# Patient Record
Sex: FEMALE | Race: WHITE
[De-identification: names, ages, dates, MRNs, and addresses within clinical notes are randomized per-mention and may not be internally consistent; named-entity substitution may affect disease eponyms.]

---

## 2022-05-09 ENCOUNTER — HOSPITAL ENCOUNTER (INPATIENT)
Dept: HOSPITAL 75 - WSO | Age: 27
LOS: 2 days | Discharge: HOME | End: 2022-05-11
Attending: FAMILY MEDICINE | Admitting: FAMILY MEDICINE
Payer: COMMERCIAL

## 2022-05-09 VITALS — SYSTOLIC BLOOD PRESSURE: 82 MMHG | DIASTOLIC BLOOD PRESSURE: 47 MMHG

## 2022-05-09 VITALS — SYSTOLIC BLOOD PRESSURE: 114 MMHG | DIASTOLIC BLOOD PRESSURE: 59 MMHG

## 2022-05-09 VITALS — DIASTOLIC BLOOD PRESSURE: 60 MMHG | SYSTOLIC BLOOD PRESSURE: 112 MMHG

## 2022-05-09 VITALS — DIASTOLIC BLOOD PRESSURE: 71 MMHG | SYSTOLIC BLOOD PRESSURE: 121 MMHG

## 2022-05-09 VITALS — DIASTOLIC BLOOD PRESSURE: 68 MMHG | SYSTOLIC BLOOD PRESSURE: 112 MMHG

## 2022-05-09 VITALS — DIASTOLIC BLOOD PRESSURE: 57 MMHG | SYSTOLIC BLOOD PRESSURE: 92 MMHG

## 2022-05-09 VITALS — SYSTOLIC BLOOD PRESSURE: 101 MMHG | DIASTOLIC BLOOD PRESSURE: 55 MMHG

## 2022-05-09 VITALS — SYSTOLIC BLOOD PRESSURE: 112 MMHG | DIASTOLIC BLOOD PRESSURE: 65 MMHG

## 2022-05-09 VITALS — DIASTOLIC BLOOD PRESSURE: 56 MMHG | SYSTOLIC BLOOD PRESSURE: 117 MMHG

## 2022-05-09 VITALS — SYSTOLIC BLOOD PRESSURE: 132 MMHG | DIASTOLIC BLOOD PRESSURE: 79 MMHG

## 2022-05-09 VITALS — DIASTOLIC BLOOD PRESSURE: 54 MMHG | SYSTOLIC BLOOD PRESSURE: 90 MMHG

## 2022-05-09 VITALS — SYSTOLIC BLOOD PRESSURE: 94 MMHG | DIASTOLIC BLOOD PRESSURE: 48 MMHG

## 2022-05-09 VITALS — DIASTOLIC BLOOD PRESSURE: 60 MMHG | SYSTOLIC BLOOD PRESSURE: 93 MMHG

## 2022-05-09 VITALS — SYSTOLIC BLOOD PRESSURE: 89 MMHG | DIASTOLIC BLOOD PRESSURE: 54 MMHG

## 2022-05-09 VITALS — DIASTOLIC BLOOD PRESSURE: 57 MMHG | SYSTOLIC BLOOD PRESSURE: 98 MMHG

## 2022-05-09 VITALS — DIASTOLIC BLOOD PRESSURE: 50 MMHG | SYSTOLIC BLOOD PRESSURE: 91 MMHG

## 2022-05-09 VITALS — DIASTOLIC BLOOD PRESSURE: 57 MMHG | SYSTOLIC BLOOD PRESSURE: 110 MMHG

## 2022-05-09 VITALS — DIASTOLIC BLOOD PRESSURE: 56 MMHG | SYSTOLIC BLOOD PRESSURE: 103 MMHG

## 2022-05-09 VITALS — DIASTOLIC BLOOD PRESSURE: 62 MMHG | SYSTOLIC BLOOD PRESSURE: 113 MMHG

## 2022-05-09 VITALS — SYSTOLIC BLOOD PRESSURE: 110 MMHG | DIASTOLIC BLOOD PRESSURE: 59 MMHG

## 2022-05-09 VITALS — SYSTOLIC BLOOD PRESSURE: 108 MMHG | DIASTOLIC BLOOD PRESSURE: 57 MMHG

## 2022-05-09 VITALS — DIASTOLIC BLOOD PRESSURE: 60 MMHG | SYSTOLIC BLOOD PRESSURE: 114 MMHG

## 2022-05-09 VITALS — SYSTOLIC BLOOD PRESSURE: 109 MMHG | DIASTOLIC BLOOD PRESSURE: 62 MMHG

## 2022-05-09 VITALS — DIASTOLIC BLOOD PRESSURE: 64 MMHG | SYSTOLIC BLOOD PRESSURE: 119 MMHG

## 2022-05-09 VITALS — SYSTOLIC BLOOD PRESSURE: 92 MMHG | DIASTOLIC BLOOD PRESSURE: 55 MMHG

## 2022-05-09 VITALS — SYSTOLIC BLOOD PRESSURE: 112 MMHG | DIASTOLIC BLOOD PRESSURE: 59 MMHG

## 2022-05-09 VITALS — SYSTOLIC BLOOD PRESSURE: 141 MMHG | DIASTOLIC BLOOD PRESSURE: 61 MMHG

## 2022-05-09 VITALS — DIASTOLIC BLOOD PRESSURE: 59 MMHG | SYSTOLIC BLOOD PRESSURE: 100 MMHG

## 2022-05-09 VITALS — DIASTOLIC BLOOD PRESSURE: 62 MMHG | SYSTOLIC BLOOD PRESSURE: 120 MMHG

## 2022-05-09 VITALS — DIASTOLIC BLOOD PRESSURE: 55 MMHG | SYSTOLIC BLOOD PRESSURE: 98 MMHG

## 2022-05-09 VITALS — DIASTOLIC BLOOD PRESSURE: 68 MMHG | SYSTOLIC BLOOD PRESSURE: 122 MMHG

## 2022-05-09 VITALS — DIASTOLIC BLOOD PRESSURE: 71 MMHG | SYSTOLIC BLOOD PRESSURE: 119 MMHG

## 2022-05-09 VITALS — DIASTOLIC BLOOD PRESSURE: 52 MMHG | SYSTOLIC BLOOD PRESSURE: 96 MMHG

## 2022-05-09 VITALS — SYSTOLIC BLOOD PRESSURE: 87 MMHG | DIASTOLIC BLOOD PRESSURE: 51 MMHG

## 2022-05-09 VITALS — SYSTOLIC BLOOD PRESSURE: 112 MMHG | DIASTOLIC BLOOD PRESSURE: 67 MMHG

## 2022-05-09 VITALS — DIASTOLIC BLOOD PRESSURE: 61 MMHG | SYSTOLIC BLOOD PRESSURE: 118 MMHG

## 2022-05-09 VITALS — SYSTOLIC BLOOD PRESSURE: 113 MMHG | DIASTOLIC BLOOD PRESSURE: 57 MMHG

## 2022-05-09 VITALS — DIASTOLIC BLOOD PRESSURE: 68 MMHG | SYSTOLIC BLOOD PRESSURE: 110 MMHG

## 2022-05-09 VITALS — WEIGHT: 293 LBS | HEIGHT: 67.99 IN | BODY MASS INDEX: 44.41 KG/M2

## 2022-05-09 VITALS — DIASTOLIC BLOOD PRESSURE: 55 MMHG | SYSTOLIC BLOOD PRESSURE: 97 MMHG

## 2022-05-09 VITALS — DIASTOLIC BLOOD PRESSURE: 43 MMHG | SYSTOLIC BLOOD PRESSURE: 82 MMHG

## 2022-05-09 VITALS — SYSTOLIC BLOOD PRESSURE: 84 MMHG | DIASTOLIC BLOOD PRESSURE: 50 MMHG

## 2022-05-09 VITALS — SYSTOLIC BLOOD PRESSURE: 87 MMHG | DIASTOLIC BLOOD PRESSURE: 54 MMHG

## 2022-05-09 VITALS — DIASTOLIC BLOOD PRESSURE: 52 MMHG | SYSTOLIC BLOOD PRESSURE: 94 MMHG

## 2022-05-09 VITALS — DIASTOLIC BLOOD PRESSURE: 61 MMHG | SYSTOLIC BLOOD PRESSURE: 120 MMHG

## 2022-05-09 VITALS — DIASTOLIC BLOOD PRESSURE: 51 MMHG | SYSTOLIC BLOOD PRESSURE: 93 MMHG

## 2022-05-09 VITALS — DIASTOLIC BLOOD PRESSURE: 58 MMHG | SYSTOLIC BLOOD PRESSURE: 100 MMHG

## 2022-05-09 VITALS — DIASTOLIC BLOOD PRESSURE: 75 MMHG | SYSTOLIC BLOOD PRESSURE: 137 MMHG

## 2022-05-09 VITALS — SYSTOLIC BLOOD PRESSURE: 89 MMHG | DIASTOLIC BLOOD PRESSURE: 52 MMHG

## 2022-05-09 VITALS — SYSTOLIC BLOOD PRESSURE: 93 MMHG | DIASTOLIC BLOOD PRESSURE: 46 MMHG

## 2022-05-09 DIAGNOSIS — Z3A.38: ICD-10-CM

## 2022-05-09 DIAGNOSIS — Z88.2: ICD-10-CM

## 2022-05-09 DIAGNOSIS — Z88.0: ICD-10-CM

## 2022-05-09 DIAGNOSIS — E66.9: ICD-10-CM

## 2022-05-09 DIAGNOSIS — Z88.1: ICD-10-CM

## 2022-05-09 LAB
ALBUMIN SERPL-MCNC: 3.3 GM/DL (ref 3.2–4.5)
ALP SERPL-CCNC: 186 U/L (ref 40–136)
ALT SERPL-CCNC: 18 U/L (ref 0–55)
BASOPHILS # BLD AUTO: 0 10^3/UL (ref 0–0.1)
BASOPHILS NFR BLD AUTO: 0 % (ref 0–10)
BILIRUB SERPL-MCNC: 0.6 MG/DL (ref 0.1–1)
BUN/CREAT SERPL: 13
CALCIUM SERPL-MCNC: 8.9 MG/DL (ref 8.5–10.1)
CHLORIDE SERPL-SCNC: 107 MMOL/L (ref 98–107)
CO2 SERPL-SCNC: 20 MMOL/L (ref 21–32)
CREAT SERPL-MCNC: 0.68 MG/DL (ref 0.6–1.3)
CREAT UR-MCNC: 143 MG/DL (ref 30–125)
EOSINOPHIL # BLD AUTO: 0 10^3/UL (ref 0–0.3)
EOSINOPHIL NFR BLD AUTO: 0 % (ref 0–10)
GFR SERPLBLD BASED ON 1.73 SQ M-ARVRAT: 124 ML/MIN
GLUCOSE SERPL-MCNC: 78 MG/DL (ref 70–105)
HCT VFR BLD CALC: 35 % (ref 35–52)
HGB BLD-MCNC: 11.9 G/DL (ref 11.5–16)
LYMPHOCYTES # BLD AUTO: 2.3 10^3/UL (ref 1–4)
LYMPHOCYTES NFR BLD AUTO: 22 % (ref 12–44)
MANUAL DIFFERENTIAL PERFORMED BLD QL: NO
MCH RBC QN AUTO: 30 PG (ref 25–34)
MCHC RBC AUTO-ENTMCNC: 34 G/DL (ref 32–36)
MCV RBC AUTO: 87 FL (ref 80–99)
MONOCYTES # BLD AUTO: 0.7 10^3/UL (ref 0–1)
MONOCYTES NFR BLD AUTO: 7 % (ref 0–12)
NEUTROPHILS # BLD AUTO: 7.2 10^3/UL (ref 1.8–7.8)
NEUTROPHILS NFR BLD AUTO: 70 % (ref 42–75)
PLATELET # BLD: 190 10^3/UL (ref 130–400)
PMV BLD AUTO: 12.3 FL (ref 9–12.2)
POTASSIUM SERPL-SCNC: 3.9 MMOL/L (ref 3.6–5)
PROT SERPL-MCNC: 6.6 GM/DL (ref 6.4–8.2)
PROT UR-MCNC: 11 MG/DL (ref 6–12)
SODIUM SERPL-SCNC: 136 MMOL/L (ref 135–145)
URATE SERPL-MCNC: 3.6 MG/DL (ref 2.6–7.2)
WBC # BLD AUTO: 10.2 10^3/UL (ref 4.3–11)

## 2022-05-09 PROCEDURE — 80053 COMPREHEN METABOLIC PANEL: CPT

## 2022-05-09 PROCEDURE — 84550 ASSAY OF BLOOD/URIC ACID: CPT

## 2022-05-09 PROCEDURE — 86901 BLOOD TYPING SEROLOGIC RH(D): CPT

## 2022-05-09 PROCEDURE — 82570 ASSAY OF URINE CREATININE: CPT

## 2022-05-09 PROCEDURE — 86850 RBC ANTIBODY SCREEN: CPT

## 2022-05-09 PROCEDURE — 86900 BLOOD TYPING SEROLOGIC ABO: CPT

## 2022-05-09 PROCEDURE — 85025 COMPLETE CBC W/AUTO DIFF WBC: CPT

## 2022-05-09 PROCEDURE — 84156 ASSAY OF PROTEIN URINE: CPT

## 2022-05-09 PROCEDURE — 36415 COLL VENOUS BLD VENIPUNCTURE: CPT

## 2022-05-09 PROCEDURE — 83615 LACTATE (LD) (LDH) ENZYME: CPT

## 2022-05-09 RX ADMIN — SODIUM CHLORIDE, SODIUM LACTATE, POTASSIUM CHLORIDE, CALCIUM CHLORIDE, AND DEXTROSE MONOHYDRATE SCH MLS/HR: 600; 310; 30; 20; 5 INJECTION, SOLUTION INTRAVENOUS at 20:23

## 2022-05-09 RX ADMIN — SODIUM CHLORIDE, SODIUM LACTATE, POTASSIUM CHLORIDE, CALCIUM CHLORIDE, AND DEXTROSE MONOHYDRATE SCH MLS/HR: 600; 310; 30; 20; 5 INJECTION, SOLUTION INTRAVENOUS at 13:15

## 2022-05-09 RX ADMIN — ONDANSETRON PRN MG: 2 INJECTION, SOLUTION INTRAMUSCULAR; INTRAVENOUS at 17:47

## 2022-05-09 RX ADMIN — ONDANSETRON PRN MG: 2 INJECTION, SOLUTION INTRAMUSCULAR; INTRAVENOUS at 22:06

## 2022-05-09 NOTE — LABOR PROGRESS NOTE
Labor Progress Note


Labor Progress Note


Date Seen by Provider:  May 9, 2022


Time Seen by Provider:  17:39


Subjective:


Pt denies complaints. Feeling much better since getting the epidural.





Objective:


/-1


Reactive FHT





Assessment/Plan:


Blanca Solis  is a (27  /Para  2 / 1,Gestational Age (wks)38.1 here for

pre eclampsia and contractions


CEFM/TOCO


AROM 1730 Clear


Epidural for pain control


Expectant management, consider augmentation with pitocin if not changing after 

AROM


GBS +, PCN allergy, on Vancomycin





Vitals - Labs


Vital Signs - I&O





Vital Signs








  Date Time  Temp Pulse Resp B/P (MAP) Pulse Ox O2 Delivery O2 Flow Rate FiO2


 


22 16:40  64  82/43 (56) 100   


 


22 16:38  76 16 103/56 (72) 99   


 


22 16:34  97 18 113/62 (79) 99 Room Air  


 


22 16:31  84 18 109/62 (78) 99 Room Air  


 


22 16:28  72 18 112/67 (82) 100 Room Air  


 


22 16:25  80 16 112/68 (83) 100 Room Air  


 


22 16:21  80 18 121/71 (88) 100 Room Air  


 


22 12:14  87 18 122/68 (86)    


 


22 11:55  90 18 119/71 (87) 98 Room Air  


 


22 11:30 36.5 90 16 132/79 (96) 98 Room Air  


 


22 11:30 36.5 90 18  98 Room Air  











Labs


Laboratory Tests


22 12:42: 


White Blood Count 10.2, Red Blood Count 4.00, Hemoglobin 11.9, Hematocrit 35, Me

an Corpuscular Volume 87, Mean Corpuscular Hemoglobin 30, Mean Corpuscular 

Hemoglobin Concent 34, Red Cell Distribution Width 13.3, Platelet Count 190, 

Mean Platelet Volume 12.3H, Immature Granulocyte % (Auto) 0, Neutrophils (%) 

(Auto) 70, Lymphocytes (%) (Auto) 22, Monocytes (%) (Auto) 7, Eosinophils (%) 

(Auto) 0, Basophils (%) (Auto) 0, Neutrophils # (Auto) 7.2, Lymphocytes # (Auto)

2.3, Monocytes # (Auto) 0.7, Eosinophils # (Auto) 0.0, Basophils # (Auto) 0.0, 

Immature Granulocyte # (Auto) 0.0, Urine Protein 11, Urine Creatinine 143H, 

Urine Protein/Creatinine Ratio 0.08, Sodium Level 136, Potassium Level 3.9, 

Chloride Level 107, Carbon Dioxide Level 20L, Anion Gap 9, Blood Urea Nitrogen 

9, Creatinine 0.68, Estimat Glomerular Filtration Rate 124, BUN/Creatinine Ratio

13, Glucose Level 78, Uric Acid 3.6, Calcium Level 8.9, Corrected Calcium 9.5, 

Total Bilirubin 0.6, Aspartate Amino Transf (AST/SGOT) 11, Alanine 

Aminotransferase (ALT/SGPT) 18, Alkaline Phosphatase 186H, Lactate Dehydrogenase

213, Total Protein 6.6, Albumin 3.3











PRINCE THAKUR MD                May 9, 2022 17:41

## 2022-05-09 NOTE — HISTORY & PHYSICAL-OB
OB - Chief Complaint & HPI


Date/Time


Date of Admission:


Date of Admission:  May 9, 2022 at 11:06


Date seen by a Provider:  May 9, 2022


Time Seen by a Provider:  11:30





Chief Complaint/History


OB-Reason for Admission/Chief:  Obstetrical Complication (Pre eclampsia)


Hx :  2


Hx Para:  1


Expected Date of Delivery:  May 22, 2022


Gestational Age in Weeks:  38


Gestational Age in Days:  1


Indication for induction:  medical complication (Pre eclampsia, advanced 

dilation, h/o  delivery)


History of Labs


A neg, Ab neg, Rub Imm


HIV/RPR/HepB/C NR


Abnormal 1 hr GTT, Normal 3 hr GTT


GBS +





Allergies and Home Medications


Allergies


Coded Allergies:  


     No Known Drug Allergies (Unverified , 21)





Patient Home Medication List


Home Medication List Reviewed:  Yes


Cefdinir (Cefdinir) 300 Mg Capsule, 300 MG PO BID


   Prescribed by: MOOKIE RAPP on 21 1148


Ondansetron (Ondansetron Odt) 4 Mg Tab.rapdis, 4 MG PO Q6H PRN for 

NAUSEA/VOMITING-1ST LINE


   Prescribed by: MOOKIE RAPP on 21 1148





OB - History


Hx of Present Pregnancy


Ultrasounds:  Normal mid trimester US


Obstetrical Complications:  Pre-eclampsia


Medical Complications:  None





Obstetrical History


Hx :  2


Hx Para:  1


Hx #  Pregnancies:  1


Number of Living Children:  1





Patient Past Medical History





Obesity





Social History/Family History


Alcohol Use:  Denies Use





Immunizations


Influenza Vaccine Up-to-Date:  Yes; Up-to-Date


First/Initial COVID19 Vaccine:  2021


Second COVID19 Vaccination:  2021


Hepatitis A:  No


Hepatitis B:  No


Tetanus Booster (TDap):  Less than 5yrs (3/21/22)


Rubella:  immune


RPR/VDRL:  Negative


GBS Status:  Positive


HBsAG:  Negative





OB - Admission Exam


Physical Exam


Vitals:





Vital Signs








 22





 11:30


 


Temp 36.5


 


Pulse 90


 


Resp 18


 


Pulse Ox 98


 


O2 Delivery Room Air








HEENT:  NCAT


Heart:  Rhythm Normal


Lungs:  Clear


Abdomen:  Gravid


Cervical Dilatation:  4cm


Effacement:  75%


Station:  0


Membranes:  Intact


Fetal Heart Rate:  140's


Accelerations:  Accelerations Present





Asles Scoring Tool (Modified)


Dilation (cm):  3-4cm (2)


Effacement (%):  51-79%  (2)


Fetal Descent/Station:  -1,0     (2)


Cervix Consistency:  Medium(1)


Cervix Position:  Middle/Mid-Position  (1)


Add 1 point for:  Each previous vaginal delivery (1)


Labs





Laboratory Tests








Test


 22


12:42 Range/Units


 


 


White Blood Count


 10.2 


 4.3-11.0


10^3/uL


 


Red Blood Count


 4.00 


 3.80-5.11


10^6/uL


 


Hemoglobin 11.9  11.5-16.0  g/dL


 


Hematocrit 35  35-52  %


 


Mean Corpuscular Volume 87  80-99  fL


 


Mean Corpuscular Hemoglobin 30  25-34  pg


 


Mean Corpuscular Hemoglobin


Concent 34 


 32-36  g/dL





 


Red Cell Distribution Width 13.3  10.0-14.5  %


 


Platelet Count


 190 


 130-400


10^3/uL


 


Mean Platelet Volume 12.3 H 9.0-12.2  fL


 


Immature Granulocyte % (Auto) 0   %


 


Neutrophils (%) (Auto) 70  42-75  %


 


Lymphocytes (%) (Auto) 22  12-44  %


 


Monocytes (%) (Auto) 7  0-12  %


 


Eosinophils (%) (Auto) 0  0-10  %


 


Basophils (%) (Auto) 0  0-10  %


 


Neutrophils # (Auto)


 7.2 


 1.8-7.8


10^3/uL


 


Lymphocytes # (Auto)


 2.3 


 1.0-4.0


10^3/uL


 


Monocytes # (Auto)


 0.7 


 0.0-1.0


10^3/uL


 


Eosinophils # (Auto)


 0.0 


 0.0-0.3


10^3/uL


 


Basophils # (Auto)


 0.0 


 0.0-0.1


10^3/uL


 


Immature Granulocyte # (Auto)


 0.0 


 0.0-0.1


10^3/uL


 


Sodium Level 136  135-145  MMOL/L


 


Potassium Level 3.9  3.6-5.0  MMOL/L


 


Chloride Level 107    MMOL/L


 


Albumin 3.3  3.2-4.5  GM/DL











OB - Assessment/Plan/Diagnosis


Assessment


Assessment:  active labor, group B positive strep


Admission Dx


Third Trimester pregnancy


38 week gestation


Pre eclampsia


Previous h/o  delivery


GBS +


Admission Status:  Inpatient Order (span 2 midnights)


Reason for Inpatient Admission:  


Pre eclampsia


Labor





Plan


Other Plan


28 yo  @ 38.1 wga here for Pre eclampsia





Plan


- GBS +


- Expectant management


- AROM after antibiotics given











PRINCE THAKUR MD                May 9, 2022 13:34

## 2022-05-10 VITALS — DIASTOLIC BLOOD PRESSURE: 59 MMHG | SYSTOLIC BLOOD PRESSURE: 102 MMHG

## 2022-05-10 VITALS — SYSTOLIC BLOOD PRESSURE: 127 MMHG | DIASTOLIC BLOOD PRESSURE: 60 MMHG

## 2022-05-10 VITALS — DIASTOLIC BLOOD PRESSURE: 59 MMHG | SYSTOLIC BLOOD PRESSURE: 126 MMHG

## 2022-05-10 VITALS — SYSTOLIC BLOOD PRESSURE: 112 MMHG | DIASTOLIC BLOOD PRESSURE: 55 MMHG

## 2022-05-10 VITALS — DIASTOLIC BLOOD PRESSURE: 56 MMHG | SYSTOLIC BLOOD PRESSURE: 102 MMHG

## 2022-05-10 VITALS — SYSTOLIC BLOOD PRESSURE: 107 MMHG | DIASTOLIC BLOOD PRESSURE: 58 MMHG

## 2022-05-10 VITALS — SYSTOLIC BLOOD PRESSURE: 120 MMHG | DIASTOLIC BLOOD PRESSURE: 62 MMHG

## 2022-05-10 VITALS — DIASTOLIC BLOOD PRESSURE: 72 MMHG | SYSTOLIC BLOOD PRESSURE: 116 MMHG

## 2022-05-10 VITALS — SYSTOLIC BLOOD PRESSURE: 124 MMHG | DIASTOLIC BLOOD PRESSURE: 63 MMHG

## 2022-05-10 VITALS — SYSTOLIC BLOOD PRESSURE: 108 MMHG | DIASTOLIC BLOOD PRESSURE: 57 MMHG

## 2022-05-10 VITALS — DIASTOLIC BLOOD PRESSURE: 64 MMHG | SYSTOLIC BLOOD PRESSURE: 115 MMHG

## 2022-05-10 VITALS — DIASTOLIC BLOOD PRESSURE: 65 MMHG | SYSTOLIC BLOOD PRESSURE: 117 MMHG

## 2022-05-10 VITALS — DIASTOLIC BLOOD PRESSURE: 66 MMHG | SYSTOLIC BLOOD PRESSURE: 106 MMHG

## 2022-05-10 VITALS — SYSTOLIC BLOOD PRESSURE: 113 MMHG | DIASTOLIC BLOOD PRESSURE: 60 MMHG

## 2022-05-10 PROCEDURE — 0HQ9XZZ REPAIR PERINEUM SKIN, EXTERNAL APPROACH: ICD-10-PCS | Performed by: FAMILY MEDICINE

## 2022-05-10 RX ADMIN — ACETAMINOPHEN SCH MG: 500 TABLET ORAL at 08:30

## 2022-05-10 RX ADMIN — IBUPROFEN SCH MG: 600 TABLET ORAL at 08:30

## 2022-05-10 RX ADMIN — ACETAMINOPHEN SCH MG: 500 TABLET ORAL at 21:31

## 2022-05-10 RX ADMIN — ACETAMINOPHEN SCH MG: 500 TABLET ORAL at 15:12

## 2022-05-10 RX ADMIN — DOCUSATE SODIUM SCH MG: 100 CAPSULE ORAL at 08:30

## 2022-05-10 RX ADMIN — ACETAMINOPHEN SCH MG: 500 TABLET ORAL at 06:46

## 2022-05-10 RX ADMIN — IBUPROFEN SCH MG: 600 TABLET ORAL at 21:30

## 2022-05-10 RX ADMIN — IBUPROFEN SCH MG: 600 TABLET ORAL at 02:07

## 2022-05-10 RX ADMIN — DOCUSATE SODIUM SCH MG: 100 CAPSULE ORAL at 21:30

## 2022-05-10 RX ADMIN — IBUPROFEN SCH MG: 600 TABLET ORAL at 15:13

## 2022-05-10 NOTE — OB LABOR & DELIVERY RECORD
Vag Delivery Note


Vag Delivery Note


Date of Delivery: 5/10/22 





Preoperative Diagnosis: Blanca Solis  is a (27  /Para  2 / 1,

Gestational Age (wks)38.1 here with contractions and Pre eclampsia with mild 

features





Postoperative Diagnosis: Same


Surgeon: PRINCE THAKUR MD 





Assistant: Araceli Fraser MS3





Anesthesia: Epidural





Delivery Type:  @ 0010





Findings: 


Viable female infant, apgars 9/9, weight 7#8, 3405 grams


Lacerations: 1st degree perineal, right labial abrasion


Intact placenta with 3 vessel cord. No nuchal cord, body cord or shoulder 

dystocia





Estimated Blood Loss: 100 ml





Complications: None





Condition: Stable





Description of Procedure:





The patient is a 27 year old female who presented for IOL for pre eclampsia. She

was admitted and informed consent was obtained. Her labor course was 

unremarkable. She progressed to complete dilatation and began to push. 





She was then set up for delivery. The infant's head was delivered atraumatically

in the CHRISSY position. The shoulders and remainder of the infant's body were then 

delivered without difficulty. Upon delivery, the head was held below the level 

of the perineum and the mouth and nares were bulb suctioned. The cord was doubly

clamped and cut after 3 min delay and the infant was attended to by the 

pediatric staff on maternal abdomen. An intact placenta with 3-vessel cord 

delivered via [place] and there was found to be minimal bleeding.~ Vigorous 

fundal massage was performed and the fundus was found to be firm. IV oxytocin 

was given. Examination of the vagina and perineum revealed a 1st degree perineal

laceration and right labial abrasion that did not require repair. Following the 

repair, sponge, instrument and needle counts were correct. Mom and baby were 

both in stable condition in the labor suite.





Vitals - Labs


Vital Signs - I&O





Vital Signs








  Date Time  Temp Pulse Resp B/P (MAP) Pulse Ox O2 Delivery O2 Flow Rate FiO2


 


22 22:45  79 18 89/54 (66) 99 Room Air  


 


22 22:30  93 18 141/61 (87) 99 Room Air  


 


22 22:18  76 18 110/57 (74) 99 Room Air  


 


22 22:15  77 18 98/57 (71) 99 Room Air  


 


22 22:00  78 18 101/55 (70) 99 Room Air  


 


22 21:45  83 18  99 Room Air  


 


22 21:30  95 18 94/48 (63) 100 Room Air  


 


22 21:15  74 18 93/51 (65) 98 Room Air  


 


22 21:00  64 18 90/54 (66) 99 Room Air  


 


22 20:45 36.3 68 18 91/50 (64) 99 Room Air  


 


22 20:30  76 18 120/61 (80) 99 Room Air  


 


22 20:15  69 18 114/59 (77) 99 Room Air  


 


22 20:00  85 18 119/64 (82) 99 Room Air  


 


22 19:45  75 18 108/57 (74) 97 Room Air  


 


22 19:30  77 18 110/59 (76) 97 Room Air  


 


22 19:15  76 18 112/59 (76) 97 Room Air  


 


22 18:25  68 18 113/57 (75) 99 Room Air  


 


22 18:10  83 16 118/61 (80) 99 Room Air  


 


22 17:55  97 18 120/62 (81) 98 Room Air  


 


22 17:44  120 18 137/75 (95) 99 Room Air  


 


22 17:38  93 16 117/56 (76) 100 Room Air  


 


22 17:31  83  91/50 (64)    


 


22 17:27  80  97/55 (69)    


 


22 17:21  77  94/52 (66) 98   


 


22 17:15 36.3 95 18 96/52 (67) 99   


 


22 17:11  83 16 92/55 (67) 99   


 


22 17:05  92  93/46 (62) 99   


 


22 17:01  100  100/59 (73)    


 


22 16:58  88  98/55 (69) 98   


 


22 16:55  74  100/58 (72)    


 


22 16:53  88  87/54 (65)    


 


22 16:51  90  89/52 (64)    


 


22 16:49  81  92/57 (69)    


 


22 16:48  93  93/60 (71)    


 


22 16:45  68  82/47 (59)    


 


22 16:43  81  84/50 (61)    


 


22 16:40  64  82/43 (56) 100   


 


22 16:38  76 16 103/56 (72) 99   


 


22 16:34  97 18 113/62 (79) 99 Room Air  


 


22 16:31  84 18 109/62 (78) 99 Room Air  


 


22 16:28  72 18 112/67 (82) 100 Room Air  


 


22 16:25  80 16 112/68 (83) 100 Room Air  


 


22 16:21  80 18 121/71 (88) 100 Room Air  


 


22 12:14  87 18 122/68 (86)    


 


22 11:55  90 18 119/71 (87) 98 Room Air  


 


22 11:30 36.5 90 16 132/79 (96) 98 Room Air  


 


22 11:30 36.5 90 18  98 Room Air  














I & O 


 


 5/10/22





 07:00


 


Intake Total 1250 ml


 


Balance 1250 ml











Labs


Laboratory Tests


22 12:42: 


White Blood Count 10.2, Red Blood Count 4.00, Hemoglobin 11.9, Hematocrit 35, 

Mean Corpuscular Volume 87, Mean Corpuscular Hemoglobin 30, Mean Corpuscular 

Hemoglobin Concent 34, Red Cell Distribution Width 13.3, Platelet Count 190, 

Mean Platelet Volume 12.3H, Immature Granulocyte % (Auto) 0, Neutrophils (%) 

(Auto) 70, Lymphocytes (%) (Auto) 22, Monocytes (%) (Auto) 7, Eosinophils (%) 

(Auto) 0, Basophils (%) (Auto) 0, Neutrophils # (Auto) 7.2, Lymphocytes # (Auto)

2.3, Monocytes # (Auto) 0.7, Eosinophils # (Auto) 0.0, Basophils # (Auto) 0.0, 

Immature Granulocyte # (Auto) 0.0, Urine Protein 11, Urine Creatinine 143H, 

Urine Protein/Creatinine Ratio 0.08, Sodium Level 136, Potassium Level 3.9, 

Chloride Level 107, Carbon Dioxide Level 20L, Anion Gap 9, Blood Urea Nitrogen 

9, Creatinine 0.68, Estimat Glomerular Filtration Rate 124, BUN/Creatinine Ratio

13, Glucose Level 78, Uric Acid 3.6, Calcium Level 8.9, Corrected Calcium 9.5, 

Total Bilirubin 0.6, Aspartate Amino Transf (AST/SGOT) 11, Alanine 

Aminotransferase (ALT/SGPT) 18, Alkaline Phosphatase 186H, Lactate Dehydrogenase

213, Total Protein 6.6, Albumin 3.3











PRINCE THAKUR MD               May 10, 2022 00:33

## 2022-05-10 NOTE — ANESTHESIA-REGIONAL POST-OP
Regional


Patient Condition


Mental Status:  Alert, Oriented x3


Circulation:  Same as Pre-Op


Headache:  Absent


Sensation:  Full Recovery


Motor Block:  Absent





Post Op Complications


Complications


None





Follow Up Care/Instructions


Patient Instructions


None needed.





Anesthesia/Patient Condition


Patient is doing well, no complaints, stable vital signs, no apparent adverse 

anesthesia problems.   


No complications reported per nursing.











AURELIA HANSON CRNA          May 10, 2022 09:55

## 2022-05-11 VITALS — SYSTOLIC BLOOD PRESSURE: 133 MMHG | DIASTOLIC BLOOD PRESSURE: 64 MMHG

## 2022-05-11 VITALS — DIASTOLIC BLOOD PRESSURE: 67 MMHG | SYSTOLIC BLOOD PRESSURE: 111 MMHG

## 2022-05-11 VITALS — SYSTOLIC BLOOD PRESSURE: 121 MMHG | DIASTOLIC BLOOD PRESSURE: 63 MMHG

## 2022-05-11 LAB
BASOPHILS # BLD AUTO: 0 10^3/UL (ref 0–0.1)
BASOPHILS NFR BLD AUTO: 0 % (ref 0–10)
EOSINOPHIL # BLD AUTO: 0.1 10^3/UL (ref 0–0.3)
EOSINOPHIL NFR BLD AUTO: 1 % (ref 0–10)
HCT VFR BLD CALC: 33 % (ref 35–52)
HGB BLD-MCNC: 11 G/DL (ref 11.5–16)
LYMPHOCYTES # BLD AUTO: 2.4 10^3/UL (ref 1–4)
LYMPHOCYTES NFR BLD AUTO: 33 % (ref 12–44)
MANUAL DIFFERENTIAL PERFORMED BLD QL: NO
MCH RBC QN AUTO: 30 PG (ref 25–34)
MCHC RBC AUTO-ENTMCNC: 33 G/DL (ref 32–36)
MCV RBC AUTO: 90 FL (ref 80–99)
MONOCYTES # BLD AUTO: 0.6 10^3/UL (ref 0–1)
MONOCYTES NFR BLD AUTO: 9 % (ref 0–12)
NEUTROPHILS # BLD AUTO: 4.2 10^3/UL (ref 1.8–7.8)
NEUTROPHILS NFR BLD AUTO: 56 % (ref 42–75)
PLATELET # BLD: 158 10^3/UL (ref 130–400)
PMV BLD AUTO: 12.3 FL (ref 9–12.2)
WBC # BLD AUTO: 7.4 10^3/UL (ref 4.3–11)

## 2022-05-11 RX ADMIN — IBUPROFEN SCH MG: 600 TABLET ORAL at 03:27

## 2022-05-11 RX ADMIN — IBUPROFEN SCH MG: 600 TABLET ORAL at 09:09

## 2022-05-11 RX ADMIN — DOCUSATE SODIUM SCH MG: 100 CAPSULE ORAL at 09:09

## 2022-05-11 RX ADMIN — ACETAMINOPHEN SCH MG: 500 TABLET ORAL at 03:28

## 2022-05-11 RX ADMIN — ACETAMINOPHEN SCH MG: 500 TABLET ORAL at 09:09

## 2022-05-11 NOTE — DISCHARGE SUMMARY
Discharge Inst-[facility]


Reconcile Patient Problems


Problems Reviewed?:  Yes





Depart Medications


New, Converted or Re-Newed RX:  Transmitted to Pharmacy


New Medications:  


Docusate Sodium (Docusate Sodium) 100 Mg Capsule


100 MG PO BID, #30 CAP





Ibuprofen (Ibu) 600 Mg Tablet


600 MG PO Q6HR, #90 TAB





 


Discontinued Medications:  


Cefdinir (Cefdinir) 300 Mg Capsule


300 MG PO BID for 5 Days, #10 CAP 0 Refills





Ondansetron (Ondansetron Odt) 4 Mg Tab.rapdis


4 MG PO Q6H PRN for NAUSEA/VOMITING-1ST LINE for 5 Days, #20 TAB











Follow Up/Instructions


Goal/Follow Up:  


F.u 6 weeks





Activity


Activity:  Activity as Tolerated


Driving Instructions:  You May Drive


NO SMOKING:  NO SMOKING


Nothing Inside Vagina:  No Douching, No [place], No Tampons





Diet


Discharge Diet:  No Restrictions


Symptoms to Report to :  Swelling Increased, Bleeding Excessive, Fever Over 

101 Degrees F


For Any Problems or Questions:  Contact Your Physician


Copies To 1:   PRINCE THAKUR MD, HOLLY R MD               May 11, 2022 10:14

## 2022-05-11 NOTE — DISCHARGE SUMMARY
Diagnosis/Chief Complaint


Date of Admission


May 9, 2022 at 11:06


Date of Discharge


22


Admission Diagnosis


Admission Diagnosis


Third Trimester pregnancy


38 week gestation


Pre eclampsia with mild features


Obesity in pregnancy





Discharge Diagnosis


 of term female infant


GBS + adequately treated





Discharge Summary-Simple/Stand


Procedures





Epidural


Discharge Physical Examination


Allergies:  


Coded Allergies:  


     amoxicillin (Verified  Allergy, Unknown, Hives, 22)


     morphine (Verified  Allergy, Unknown, Vomiting, 22)


Vitals & I&Os





Vital Sign - Last 12Hours








  Date Time  Temp Pulse Resp B/P (MAP) Pulse Ox O2 Delivery O2 Flow Rate FiO2


 


22 06:35 36.1 80 18 121/63 (82) 97 Room Air  








General Appearance:  Alert, Oriented X3, Cooperative, No Acute Distress


Respiratory:  Clear to Auscultation, Normal Air Movement


Cardiovascular:  Regular Rate, No Murmurs


Abdominal:  Normal Bowel Sounds, Soft, No Tenderness, No Masses, Other (Fundus 

firm and below umbilicus)


Extremities:  Other (1+ edema)


Neuro:  Normal Speech


Psych/Mental Status:  Mental Status NL, Mood NL





Hospital Course


Was the Problem List Reviewed?:  Yes


See final discharge diagnosis.





Discussion & Recommendations


28 yo  @ 38.1 wga sent here with pre eclampsia, delivered term female infant

via , adequately treated for GBS. F.u with Dr Weston





Discharge


Condition at discharge


Stable


Instructions to patient/family


Please see electronic discharge instructions given to patient.


Discharge Medications


Reviewed and agree with Discharge Medication list on patient's Discharge 

Instruction sheet





Copy


Copies To 1:   PRINCE WESTON MD, HOLLY R MD               May 11, 2022 10:12